# Patient Record
Sex: FEMALE | Race: WHITE | Employment: OTHER | URBAN - METROPOLITAN AREA
[De-identification: names, ages, dates, MRNs, and addresses within clinical notes are randomized per-mention and may not be internally consistent; named-entity substitution may affect disease eponyms.]

---

## 2017-10-04 PROBLEM — N82.4 COLOVAGINAL FISTULA: Status: ACTIVE | Noted: 2017-10-04

## 2019-01-01 ENCOUNTER — APPOINTMENT (OUTPATIENT)
Dept: CT IMAGING | Age: 84
End: 2019-01-01
Payer: MEDICARE

## 2019-01-01 ENCOUNTER — NURSE ONLY (OUTPATIENT)
Dept: FAMILY MEDICINE CLINIC | Age: 84
End: 2019-01-01
Payer: MEDICARE

## 2019-01-01 ENCOUNTER — HOSPITAL ENCOUNTER (OUTPATIENT)
Dept: NON INVASIVE DIAGNOSTICS | Age: 84
Discharge: HOME | End: 2019-01-28
Payer: MEDICARE

## 2019-01-01 ENCOUNTER — PROCEDURE VISIT (OUTPATIENT)
Dept: PODIATRY | Age: 84
End: 2019-01-01
Payer: MEDICARE

## 2019-01-01 ENCOUNTER — TELEPHONE (OUTPATIENT)
Dept: ADMINISTRATIVE | Age: 84
End: 2019-01-01

## 2019-01-01 ENCOUNTER — TELEPHONE (OUTPATIENT)
Dept: FAMILY MEDICINE CLINIC | Age: 84
End: 2019-01-01

## 2019-01-01 ENCOUNTER — HOSPITAL ENCOUNTER (EMERGENCY)
Age: 84
Discharge: ANOTHER HOSPITAL | End: 2019-05-24
Attending: EMERGENCY MEDICINE
Payer: MEDICARE

## 2019-01-01 ENCOUNTER — OFFICE VISIT (OUTPATIENT)
Dept: FAMILY MEDICINE CLINIC | Age: 84
End: 2019-01-01
Payer: MEDICARE

## 2019-01-01 VITALS
HEIGHT: 67 IN | WEIGHT: 160.6 LBS | DIASTOLIC BLOOD PRESSURE: 64 MMHG | SYSTOLIC BLOOD PRESSURE: 104 MMHG | BODY MASS INDEX: 25.21 KG/M2

## 2019-01-01 VITALS
TEMPERATURE: 98.2 F | SYSTOLIC BLOOD PRESSURE: 91 MMHG | BODY MASS INDEX: 34.52 KG/M2 | DIASTOLIC BLOOD PRESSURE: 46 MMHG | HEIGHT: 57 IN | OXYGEN SATURATION: 72 % | RESPIRATION RATE: 16 BRPM | HEART RATE: 62 BPM | WEIGHT: 160 LBS

## 2019-01-01 VITALS
TEMPERATURE: 98.7 F | RESPIRATION RATE: 16 BRPM | SYSTOLIC BLOOD PRESSURE: 96 MMHG | HEART RATE: 64 BPM | BODY MASS INDEX: 30.25 KG/M2 | WEIGHT: 160.2 LBS | DIASTOLIC BLOOD PRESSURE: 52 MMHG | OXYGEN SATURATION: 100 % | HEIGHT: 61 IN

## 2019-01-01 DIAGNOSIS — M79.675 PAIN OF TOE OF LEFT FOOT: ICD-10-CM

## 2019-01-01 DIAGNOSIS — R26.2 DIFFICULTY WALKING: ICD-10-CM

## 2019-01-01 DIAGNOSIS — B35.1 DERMATOPHYTOSIS OF NAIL: Primary | ICD-10-CM

## 2019-01-01 DIAGNOSIS — K92.1 GASTROINTESTINAL HEMORRHAGE WITH MELENA: Primary | ICD-10-CM

## 2019-01-01 DIAGNOSIS — K62.5 RECTAL BLEEDING: ICD-10-CM

## 2019-01-01 DIAGNOSIS — I48.0 PAROXYSMAL ATRIAL FIBRILLATION (HCC): Primary | ICD-10-CM

## 2019-01-01 DIAGNOSIS — C18.9 MALIGNANT NEOPLASM OF COLON, UNSPECIFIED PART OF COLON (HCC): ICD-10-CM

## 2019-01-01 DIAGNOSIS — M79.674 PAIN OF TOE OF RIGHT FOOT: ICD-10-CM

## 2019-01-01 DIAGNOSIS — I73.9 PERIPHERAL VASCULAR DISORDER (HCC): ICD-10-CM

## 2019-01-01 DIAGNOSIS — I10 ESSENTIAL HYPERTENSION, BENIGN: ICD-10-CM

## 2019-01-01 DIAGNOSIS — Z95.2 S/P TAVR (TRANSCATHETER AORTIC VALVE REPLACEMENT): ICD-10-CM

## 2019-01-01 LAB
ABO/RH: NORMAL
ALBUMIN SERPL-MCNC: 3.6 G/DL (ref 3.5–5.2)
ALP BLD-CCNC: 54 U/L (ref 35–104)
ALT SERPL-CCNC: 11 U/L (ref 0–32)
ANION GAP SERPL CALCULATED.3IONS-SCNC: 14 MMOL/L (ref 7–16)
ANTIBODY SCREEN: NORMAL
AST SERPL-CCNC: 19 U/L (ref 0–31)
BASOPHILS ABSOLUTE: 0.02 E9/L (ref 0–0.2)
BASOPHILS RELATIVE PERCENT: 0.3 % (ref 0–2)
BILIRUB SERPL-MCNC: 0.2 MG/DL (ref 0–1.2)
BLOOD BANK DISPENSE STATUS: NORMAL
BLOOD BANK DISPENSE STATUS: NORMAL
BLOOD BANK PRODUCT CODE: NORMAL
BLOOD BANK PRODUCT CODE: NORMAL
BPU ID: NORMAL
BPU ID: NORMAL
BUN BLDV-MCNC: 72 MG/DL (ref 8–23)
CALCIUM SERPL-MCNC: 8.6 MG/DL (ref 8.6–10.2)
CHLORIDE BLD-SCNC: 101 MMOL/L (ref 98–107)
CHP ED QC CHECK: NORMAL
CO2: 24 MMOL/L (ref 22–29)
CREAT SERPL-MCNC: 1.4 MG/DL (ref 0.5–1)
DESCRIPTION BLOOD BANK: NORMAL
DESCRIPTION BLOOD BANK: NORMAL
EKG ATRIAL RATE: 59 BPM
EKG Q-T INTERVAL: 456 MS
EKG QRS DURATION: 134 MS
EKG QTC CALCULATION (BAZETT): 478 MS
EKG R AXIS: 95 DEGREES
EKG T AXIS: 18 DEGREES
EKG VENTRICULAR RATE: 66 BPM
EOSINOPHILS ABSOLUTE: 0.1 E9/L (ref 0.05–0.5)
EOSINOPHILS RELATIVE PERCENT: 1.6 % (ref 0–6)
GFR AFRICAN AMERICAN: 43
GFR NON-AFRICAN AMERICAN: 36 ML/MIN/1.73
GLUCOSE BLD-MCNC: 119 MG/DL (ref 74–99)
HCT VFR BLD CALC: 21.7 % (ref 34–48)
HEMOCCULT STL QL: POSITIVE
HEMOGLOBIN: 6.4 G/DL (ref 11.5–15.5)
HGB, POC: 8.2
HGB, POC: 8.6
IMMATURE GRANULOCYTES #: 0.04 E9/L
IMMATURE GRANULOCYTES %: 0.6 % (ref 0–5)
INR BLD: 5.3
INTERNATIONAL NORMALIZATION RATIO, POC: 1.4
INTERNATIONAL NORMALIZATION RATIO, POC: 2.5
INTERNATIONAL NORMALIZATION RATIO, POC: 2.9
INTERNATIONAL NORMALIZATION RATIO, POC: 3.2
LACTIC ACID, SEPSIS: 2.2 MMOL/L (ref 0.5–1.9)
LV EF: 65 %
LVEF MODALITY: NORMAL
LYMPHOCYTES ABSOLUTE: 1.12 E9/L (ref 1.5–4)
LYMPHOCYTES RELATIVE PERCENT: 17.6 % (ref 20–42)
MCH RBC QN AUTO: 32 PG (ref 26–35)
MCHC RBC AUTO-ENTMCNC: 29.5 % (ref 32–34.5)
MCV RBC AUTO: 108.5 FL (ref 80–99.9)
MONOCYTES ABSOLUTE: 0.5 E9/L (ref 0.1–0.95)
MONOCYTES RELATIVE PERCENT: 7.8 % (ref 2–12)
NEUTROPHILS ABSOLUTE: 4.59 E9/L (ref 1.8–7.3)
NEUTROPHILS RELATIVE PERCENT: 72.1 % (ref 43–80)
PDW BLD-RTO: 15.9 FL (ref 11.5–15)
PLATELET # BLD: 132 E9/L (ref 130–450)
PMV BLD AUTO: 10.4 FL (ref 7–12)
POTASSIUM SERPL-SCNC: 4.4 MMOL/L (ref 3.5–5)
PROTHROMBIN TIME, POC: 17.4
PROTHROMBIN TIME, POC: 30.4
PROTHROMBIN TIME, POC: 34.5
PROTHROMBIN TIME, POC: 38
PROTHROMBIN TIME: 58.4 SEC (ref 9.3–12.4)
RBC # BLD: 2 E12/L (ref 3.5–5.5)
SODIUM BLD-SCNC: 139 MMOL/L (ref 132–146)
TOTAL PROTEIN: 5.3 G/DL (ref 6.4–8.3)
TROPONIN: <0.01 NG/ML (ref 0–0.03)
WBC # BLD: 6.4 E9/L (ref 4.5–11.5)

## 2019-01-01 PROCEDURE — 85610 PROTHROMBIN TIME: CPT | Performed by: FAMILY MEDICINE

## 2019-01-01 PROCEDURE — 84484 ASSAY OF TROPONIN QUANT: CPT

## 2019-01-01 PROCEDURE — 99285 EMERGENCY DEPT VISIT HI MDM: CPT

## 2019-01-01 PROCEDURE — 85018 HEMOGLOBIN: CPT | Performed by: FAMILY MEDICINE

## 2019-01-01 PROCEDURE — 11721 DEBRIDE NAIL 6 OR MORE: CPT | Performed by: PODIATRIST

## 2019-01-01 PROCEDURE — 93306 TTE W/DOPPLER COMPLETE: CPT

## 2019-01-01 PROCEDURE — 86923 COMPATIBILITY TEST ELECTRIC: CPT

## 2019-01-01 PROCEDURE — 36430 TRANSFUSION BLD/BLD COMPNT: CPT

## 2019-01-01 PROCEDURE — 83605 ASSAY OF LACTIC ACID: CPT

## 2019-01-01 PROCEDURE — 74176 CT ABD & PELVIS W/O CONTRAST: CPT

## 2019-01-01 PROCEDURE — 86850 RBC ANTIBODY SCREEN: CPT

## 2019-01-01 PROCEDURE — 1123F ACP DISCUSS/DSCN MKR DOCD: CPT | Performed by: FAMILY MEDICINE

## 2019-01-01 PROCEDURE — 80053 COMPREHEN METABOLIC PANEL: CPT

## 2019-01-01 PROCEDURE — 93005 ELECTROCARDIOGRAM TRACING: CPT | Performed by: STUDENT IN AN ORGANIZED HEALTH CARE EDUCATION/TRAINING PROGRAM

## 2019-01-01 PROCEDURE — 93010 ELECTROCARDIOGRAM REPORT: CPT | Performed by: INTERNAL MEDICINE

## 2019-01-01 PROCEDURE — 86901 BLOOD TYPING SEROLOGIC RH(D): CPT

## 2019-01-01 PROCEDURE — 1090F PRES/ABSN URINE INCON ASSESS: CPT | Performed by: FAMILY MEDICINE

## 2019-01-01 PROCEDURE — 4040F PNEUMOC VAC/ADMIN/RCVD: CPT | Performed by: FAMILY MEDICINE

## 2019-01-01 PROCEDURE — G8427 DOCREV CUR MEDS BY ELIG CLIN: HCPCS | Performed by: FAMILY MEDICINE

## 2019-01-01 PROCEDURE — G8417 CALC BMI ABV UP PARAM F/U: HCPCS | Performed by: FAMILY MEDICINE

## 2019-01-01 PROCEDURE — 93793 ANTICOAG MGMT PT WARFARIN: CPT | Performed by: FAMILY MEDICINE

## 2019-01-01 PROCEDURE — 2580000003 HC RX 258: Performed by: STUDENT IN AN ORGANIZED HEALTH CARE EDUCATION/TRAINING PROGRAM

## 2019-01-01 PROCEDURE — 85025 COMPLETE CBC W/AUTO DIFF WBC: CPT

## 2019-01-01 PROCEDURE — 99214 OFFICE O/P EST MOD 30 MIN: CPT | Performed by: FAMILY MEDICINE

## 2019-01-01 PROCEDURE — 1036F TOBACCO NON-USER: CPT | Performed by: FAMILY MEDICINE

## 2019-01-01 PROCEDURE — P9016 RBC LEUKOCYTES REDUCED: HCPCS

## 2019-01-01 PROCEDURE — 85610 PROTHROMBIN TIME: CPT

## 2019-01-01 PROCEDURE — 6370000000 HC RX 637 (ALT 250 FOR IP): Performed by: STUDENT IN AN ORGANIZED HEALTH CARE EDUCATION/TRAINING PROGRAM

## 2019-01-01 PROCEDURE — 86900 BLOOD TYPING SEROLOGIC ABO: CPT

## 2019-01-01 RX ORDER — LISINOPRIL 5 MG/1
TABLET ORAL
Refills: 3 | COMMUNITY
Start: 2019-01-01 | End: 2019-01-01 | Stop reason: SDUPTHER

## 2019-01-01 RX ORDER — LISINOPRIL 5 MG/1
TABLET ORAL
Qty: 90 TABLET | Refills: 3 | Status: SHIPPED | OUTPATIENT
Start: 2019-01-01 | End: 2019-01-01 | Stop reason: SDUPTHER

## 2019-01-01 RX ORDER — 0.9 % SODIUM CHLORIDE 0.9 %
250 INTRAVENOUS SOLUTION INTRAVENOUS ONCE
Status: COMPLETED | OUTPATIENT
Start: 2019-01-01 | End: 2019-01-01

## 2019-01-01 RX ORDER — METOLAZONE 2.5 MG/1
2.5 TABLET ORAL DAILY
COMMUNITY
End: 2019-01-01 | Stop reason: SDUPTHER

## 2019-01-01 RX ORDER — ASPIRIN 81 MG/1
81 TABLET ORAL DAILY
Qty: 90 TABLET | Refills: 1 | Status: ON HOLD
Start: 2019-01-01 | End: 2019-01-01 | Stop reason: HOSPADM

## 2019-01-01 RX ORDER — WARFARIN SODIUM 3 MG/1
TABLET ORAL
Status: ON HOLD | COMMUNITY
Start: 2019-01-01 | End: 2019-01-01 | Stop reason: HOSPADM

## 2019-01-01 RX ORDER — 0.9 % SODIUM CHLORIDE 0.9 %
500 INTRAVENOUS SOLUTION INTRAVENOUS ONCE
Status: COMPLETED | OUTPATIENT
Start: 2019-01-01 | End: 2019-01-01

## 2019-01-01 RX ORDER — LISINOPRIL 5 MG/1
TABLET ORAL
Qty: 90 TABLET | Refills: 3 | Status: ON HOLD | OUTPATIENT
Start: 2019-01-01 | End: 2019-01-01 | Stop reason: HOSPADM

## 2019-01-01 RX ORDER — CLOPIDOGREL BISULFATE 75 MG/1
TABLET ORAL
Refills: 0 | COMMUNITY
Start: 2019-01-01 | End: 2019-01-01 | Stop reason: ALTCHOICE

## 2019-01-01 RX ORDER — PHYTONADIONE 5 MG/1
5 TABLET ORAL ONCE
Status: DISCONTINUED | OUTPATIENT
Start: 2019-01-01 | End: 2019-01-01

## 2019-01-01 RX ORDER — METOLAZONE 2.5 MG/1
2.5 TABLET ORAL DAILY
Qty: 36 TABLET | Refills: 3 | Status: ON HOLD | OUTPATIENT
Start: 2019-01-01 | End: 2019-01-01 | Stop reason: HOSPADM

## 2019-01-01 RX ADMIN — SODIUM CHLORIDE 250 ML: 9 INJECTION, SOLUTION INTRAVENOUS at 14:00

## 2019-01-01 RX ADMIN — SODIUM CHLORIDE 500 ML: 9 INJECTION, SOLUTION INTRAVENOUS at 12:41

## 2019-01-01 ASSESSMENT — PATIENT HEALTH QUESTIONNAIRE - PHQ9
SUM OF ALL RESPONSES TO PHQ9 QUESTIONS 1 & 2: 0
1. LITTLE INTEREST OR PLEASURE IN DOING THINGS: 0
SUM OF ALL RESPONSES TO PHQ QUESTIONS 1-9: 0
2. FEELING DOWN, DEPRESSED OR HOPELESS: 0
SUM OF ALL RESPONSES TO PHQ QUESTIONS 1-9: 0

## 2019-01-01 ASSESSMENT — ENCOUNTER SYMPTOMS
DIARRHEA: 0
BACK PAIN: 0
CONSTIPATION: 0
WHEEZING: 0
SHORTNESS OF BREATH: 0
VOMITING: 0
ABDOMINAL DISTENTION: 0
NAUSEA: 0
COLOR CHANGE: 1
RHINORRHEA: 0
TROUBLE SWALLOWING: 0
ABDOMINAL PAIN: 0
COUGH: 0
SINUS PRESSURE: 0
CHEST TIGHTNESS: 0
SORE THROAT: 0
BLOOD IN STOOL: 1

## 2019-05-07 NOTE — PROGRESS NOTES
HPI:  The patient is a 80 y.o. female who presents today to establish care. The patient had trans catheter aortic valve replacement done on 4/22 by Dr Noelle Peñaloza at [facility] and needs anticoagulation management for valve. She has h/o breast cancer in 2009. She was recently was diagnosed with colon cancer but could not do the surgery due aortic valve. She is breathing better since surgery. She has cardiologist at the [facility] also. She has h/o A fib and has been on Coumadin for years. She is on 4 mg daily. She is also on Plavix and ASA in addition since the surgery.      Past Medical History:   Diagnosis Date    A-fib [facility])     Acute bronchospasm 8/29/2014    Acute respiratory failure with hypoxia ([address] 8/29/2014    Anticoagulant long-term use     Anticoagulated on Coumadin 8/29/2014    Arthritis     Atrial fibrillation and flutter ([address] 8/29/2014    CAD (coronary artery disease)     Cancer [facility])     right breast, oncology Dr Yony Rodriguez of ascending colon [facility])     Cellulitis of arm, right 12/26/2015    CHF (congestive heart failure) (HCC)     Chronic back pain     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    Diverticular disease     Hearing loss     Heart murmur     Hyperlipidemia     Hypothyroidism 8/29/2014    Kidney disease     Osteoarthritis     Peripheral edema 8/29/2014    Pneumonia due to Streptococcus pneumoniae ([address] 8/29/2014    Probable      Thyroid disease       Past Surgical History:   Procedure Laterality Date    APPENDECTOMY      BREAST SURGERY  2009    lumpectomy with LND, chemo and radiation    CARDIAC VALVE REPLACEMENT      trans catheter aortic valve replacement    CHOLECYSTECTOMY      lap    COLECTOMY  10/04/2017    LAR and SBR    [facility]  2016, [facility]    EYE SURGERY Left     for macular hole    HAND SURGERY      HYSTERECTOMY      ALY/USO    JOINT REPLACEMENT      bilateral knees    TONSILLECTOMY AND ADENOIDECTOMY sensitive skin  ENT:  No cough, no sore throat, no sinus pain, no runny nose, no ear pain  Cardiology:  No chest pain, no palpitations, no leg edema, no shortness of breath, no PND  Endocrinology:  No polydipsia, no polyuria, no cold intolerance, no heat intolerance, no polyphagia, no hair changes  Gastroenterology:  No dysphagia, no abdominal pain, no nausea, no vomiting, no constipation, no diarrhea, no heartburn  Female Reproductive:  No hot flashes, no abnormal vaginal discharge, no pain with menstruation, no pelvic pain  Musculoskeletal:  No joint pain, no leg cramps, no back pain, no muscle aches  Respiratory:  No shortness of breath, no orthopnea, no wheezing, no ZAPATA, no hemoptysis  Urology:  No blood in the urine, no urinary frequency, no urinary incontinence, no urinary urgency, no nocturia, no dysuria  Neurology:  No numbness/tingling, no dizziness, no weakness  Psychology:  No depression, no sleep disturbances, no suicidal ideation, no anxiety    Physical Exam:  Vitals:    05/07/19 0917   BP: (!) 96/52   Pulse: 64   Resp: 16   Temp: 98.7 °F (37.1 °C)   TempSrc: Oral   SpO2: 100%   Weight: 160 lb 3.2 oz (72.7 kg)   Height: 5' 1\" (1.549 m)     General:  Patient alert and oriented x 3, NAD, pleasant  HEENT:  Atraumatic, normocephalic, PERRLA, EOMI, clear conjunctiva, TMs clear, nose-clear, throat - no erythema, tonsils- wnl  Neck:  Supple, no goiter, no carotid bruits, no lymphadenopathy  Lungs:  CTA B  Heart:  RRR, no murmurs, gallops or rubs  Abdomen:  Soft, NTND, + bowel sounds  Back: full ROM, no CVA tenderness  Extremities:  No clubbing, cyanosis or edema  Neuro:  CN II-XII grossly intact, 5/5 strength in bilateral upper and lower extremities, 2 + reflexes. Skin: unremarkable    Assessment/Plan:  Armin Mcintosh was seen today for establish care.     Diagnoses and all orders for this visit:    Paroxysmal atrial fibrillation ([address]  -     POCT INR    Essential hypertension, benign    Malignant neoplasm of

## 2019-05-13 PROBLEM — B35.1 DERMATOPHYTOSIS OF NAIL: Status: ACTIVE | Noted: 2019-01-01

## 2019-05-13 PROBLEM — I73.9 PERIPHERAL VASCULAR DISORDER (HCC): Status: ACTIVE | Noted: 2019-01-01

## 2019-05-13 NOTE — PROGRESS NOTES
19     Escobar Learn    : 4/15/1932  Sex: female  Age: 80 y.o. Subjective: The patient is seen today for evaluation regarding foot evaluation and mycotic nail care. No other complaints noted. No chief complaint on file. Current Medications:    Current Outpatient Medications:     clopidogrel (PLAVIX) 75 MG tablet, TAKE ONE TABLET BY MOUTH EVERY DAY. DISCONTINUE WHEN INR IS > 2., Disp: , Rfl: 0    lisinopril (PRINIVIL;ZESTRIL) 5 MG tablet, TAKE ONE TABLET BY MOUTH EVERY DAY, Disp: , Rfl: 3    metolazone (ZAROXOLYN) 2.5 MG tablet, Take 2.5 mg by mouth daily Monday, Wednesday and Friday, Disp: , Rfl:     aspirin EC 81 MG EC tablet, Take 1 tablet by mouth daily, Disp: 90 tablet, Rfl: 1    furosemide (LASIX) 40 MG tablet, Take 1 tablet by mouth 2 times daily, Disp: 60 tablet, Rfl: 0    warfarin (COUMADIN) 4 MG tablet, Take 4 mg by mouth daily Indications: every Tuesday, Thursday, and Saturday, Disp: , Rfl:     calcium carbonate (OSCAL) 500 MG TABS tablet, Take 500 mg by mouth daily, Disp: , Rfl:     Cholecalciferol (VITAMIN D-3 PO), Take 1 capsule by mouth daily, Disp: , Rfl:     acetaminophen-codeine (TYLENOL #3) 300-30 MG per tablet, Take 1 tablet by mouth daily, Disp: , Rfl:     levothyroxine (SYNTHROID) 25 MCG tablet, Take 25 mcg by mouth Daily. , Disp: , Rfl:     Multiple Vitamins-Minerals (CENTRUM SILVER ADULT 50+ PO), Take 1 tablet by mouth daily , Disp: , Rfl:     omeprazole (PRILOSEC) 20 MG capsule, Take 40 mg by mouth daily , Disp: , Rfl:     warfarin (COUMADIN) 3 MG tablet, , Disp: , Rfl:     Allergies:   Allergies   Allergen Reactions    Penicillins Hives       Past Surgical History:   Procedure Laterality Date    APPENDECTOMY      BREAST SURGERY  2009    lumpectomy with LND, chemo and radiation    CARDIAC VALVE REPLACEMENT      trans catheter aortic valve replacement    CHOLECYSTECTOMY      lap    COLECTOMY  10/04/2017    LAR and SBR    [facility]COPY  , [facility]    EYE SURGERY Left     for macular hole    HAND SURGERY      HYSTERECTOMY      ALY/USO    JOINT REPLACEMENT      bilateral knees    TONSILLECTOMY AND ADENOIDECTOMY       Past Medical History:   Diagnosis Date    A-fib ([address]     Acute bronchospasm 8/29/2014    Acute respiratory failure with hypoxia ([address] 8/29/2014    Anticoagulant long-term use     Anticoagulated on Coumadin 8/29/2014    Arthritis     Atrial fibrillation and flutter ([address] 8/29/2014    CAD (coronary artery disease)     Cancer (HCC)     right breast, oncology Dr Anibal Tan of ascending colon ([address]     Cellulitis of arm, right 12/26/2015    CHF (congestive heart failure) (HCC)     Chronic back pain     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 At this time the nail/s 1, 2, 5 right foot and nail/s 1, 2, 5 left foot are noted to be thickened, dystrophic and discolored with subungual debris present. Tenderness noted to palpation. Minimal hair growth is noted to both lower extremities. Edematous issues noted to both lower extremities with varicosities present bilaterally. Coolness is noted to the digital regions to palpation. Capillary fill time delayed digital areas bilateral foot. No heel fissuring or macerations of the web spaces. No plantar calluses and/or ulcerative areas are noted. Patient is having difficulty with gait/walking.              Plan Per Assessment  Diagnoses and all orders for this visit:    Dermatophytosis of nail    Pain of toe of right foot    Pain of toe of left foot    Peripheral vascular disorder (HCC)    Difficulty walking        1. Evaluation and Management  2. Manual and electrical debridement of the mycotic nails was performed for thickness and length to prevent injection and/or ulceration. 3. I recommended antifungal cream to the nails daily. 4. It was discussed in detail with the patient proper caring for the vascular compromised foot. The fact that they have compromised blood flow put the patient at risk for infection/gangrene/amputation. The patient should not walk barefoot. Shoe gear should fit properly and socks should be worn with shoes. Exercise is very important to prevent worsening of the disease process but before performing an exercise program should check with their family physician first.  If any skin lesions are noted, they are instructed to contact the office immediately. 5. We will see the patient back at a later date for continued podiatric management and care. Patient was advised to call the office with any questions or concerns prior to their next appointment if needed. Seen By:    Lonnie Pierre DPM    Electronically signed by Lonnie Pierre DPM on 5/13/2019 at 11:07 AM      This note was created using voice recognition software. The note was reviewed however may contain grammatical errors.

## 2019-05-16 NOTE — TELEPHONE ENCOUNTER
Spoke with patient daughter, she said that the patient has colon cancer and is having bright red rectal bleeding when she goes to the bathroom. She said that she is having 2-3 boughts of diarrhea with blood. She thinks the patient took plavix yesterday. I spoke with Dr Gunnar Villalpando, she advised the patient to go to ED. Patient daughter notified. She said that she is going to try to get ahold of her Dr in [facility] [employer] again to see what he suggests. I told her that she really needed to take her to ER.  She OK but she was concerned because of her new heart valve what they would do to her in the hospital.

## 2019-05-16 NOTE — TELEPHONE ENCOUNTER
Patient should stop plavix. Continue BP med. Sent refill to pharmacy. Take iron pills as prescribed by oncologist.  Repeat inr Monday.   If bleeding worsens-To ER

## 2019-05-17 NOTE — TELEPHONE ENCOUNTER
Spoke to patient's daughter and she will notify her mother that she needs to come for INR today. Instructed daughter to make sure she is not taking Plavix. Patient's daughter states she called  in [facility] [place] last night and was told to not give her anymore than 4 mg of Coumadin last night and that is the dose she gave her. Daughter asked that any instructions given to patient be written down because she does have a tendency to forget. Daughter also said that they did not go to ER and bleeding did slow down.

## 2019-05-24 PROBLEM — K92.2 GI BLEED: Status: ACTIVE | Noted: 2019-01-01

## 2019-05-24 NOTE — ED NOTES
Received critical hgb 6.4 from lab. Dr. Shannan Soriano notified.      Wyatt Herrera, RN  05/24/19 [id number]

## 2019-05-24 NOTE — ED NOTES
Report given to Hillary Vuong at [facility].      Shubham Knowles RN  05/24/19 4896

## 2019-05-24 NOTE — ED NOTES
Accepted at [facility] to [facility] bed 703. Nurse to nurse can be called to [phone].       Thora Cockayne, RN  05/24/19 7352

## 2019-05-24 NOTE — CARE COORDINATION
OF THE [facility] notified of ED PCP request for [facility].     Electronically signed by Afshin Lopes on 0/75/1609 at 4:54 PM

## 2019-05-24 NOTE — ED NOTES
[facility] [employer] present to  patient.   Taylor ZEPEDA getting unit of blood for medivac to hang     Dasia Hale RN  05/24/19 3838

## 2019-05-24 NOTE — PROGRESS NOTES
[phone] called [phone] [facility] to Transfer the patient to [facility]. Need to speak with Cardiology- Dr. Hailee Gray and Colorectal surgeon Dr. Chandu Jc. Dx: Anemia, GI bleed on coumadin for TARV, patient needs transfusion, Colon CA.     Reason for transfer: Doctors are at [facility]

## 2019-05-24 NOTE — ED NOTES
Blood infusion began. Remaining at bedside for first 15 minutes of infusion.      Gianluca Rodgers RN  05/24/19 7071

## 2019-05-24 NOTE — ED PROVIDER NOTES
Murmur heard. Pulmonary/Chest: Effort normal and breath sounds normal. No respiratory distress. She has no wheezes. She has no rales. She exhibits no tenderness. Abdominal: Soft. She exhibits no distension and no mass. There is no tenderness. There is no rebound and no guarding. Musculoskeletal: Normal range of motion. She exhibits no tenderness. Neurological: She is alert and oriented to person, place, and time. Skin: Skin is warm and dry. No rash noted. She is not diaphoretic. There is pallor. Psychiatric: She has a normal mood and affect. Her behavior is normal. Judgment and thought content normal.       Procedures    MDM  Number of Diagnoses or Management Options  Gastrointestinal hemorrhage with melena: Malignant neoplasm of colon, unspecified part of colon [facility]):   Diagnosis management comments: Patient is a 51-year-old female who presented to ED for evaluation of rectal bleeding in setting of colorectal cancer and anticoagulated on Coumadin with recent TAVR. On arrival to ED, physical exam significant for patient with pale appearance, hemodynamically stable, in no apparent distress with abdomen soft and nontender to palpation. Labs revealed patient anemic with supratherapeutic INR. Arnaud Nelson Did not reverse INR in setting of GI bleed secondary to recent TAVR. Cardiology was consulted and agrees with decision for no reversal with no recommendation at this time. Patient was crossed for blood products. Imaging with no significant findings. 11:57 PM-- informed of laboratory result findings with hgb 6.4. Order for crossed blood products placed. 12:02 PM-- reevaluation at this time with vitals unremarkable and within normal limits. Patient with no new complaints. 12:31 PM-- informed of supratherapeutic INR 5.3. Will administer vitamin K in setting of active bleed with supra-therapeutic INR.   12:35 PM-- patient cannot undergo imaging with IV contrast secondary to acute kidney injury, will obtain CT imaging of the abdomen without contrast.  1:28 PM-- No significant findings on imaging. Patient was reevaluated at this time. There are no new complaints. 2:06 PM-- patient not wanting to be admitted. Spoke with patient at length with family member present. Patient agrees to admission for further evaluation. 2:38 PM--  2:32 PM-- Spoke with cardiologist SHANTAL, Nirav Ivan-- agrees with no reversal of Coumadin at this time in setting of recent TAVR. No recommendations at this time. 4:33 PM-- Spoke with [employer], no ALS available. Will try to fly patient.       --------------------------------------------- PAST HISTORY ---------------------------------------------  Past Medical History:  has a past medical history of A-fib ([address] Acute bronchospasm, Acute respiratory failure with hypoxia ([address] Anticoagulant long-term use, Anticoagulated on Coumadin, Arthritis, Atrial fibrillation and flutter ([address] CAD (coronary artery disease), Cancer ([address] Cancer of ascending colon ([address] Cellulitis of arm, right, CHF (congestive heart failure) ([address] Chronic back pain, Colovaginal fistula, Diverticular disease, Hearing loss, Heart murmur, Hyperlipidemia, Hypothyroidism, Kidney disease, Osteoarthritis, Peripheral edema, Pneumonia due to Streptococcus pneumoniae ([address] and Thyroid disease. Past Surgical History:  has a past surgical history that includes Cholecystectomy; Hand surgery; Breast surgery (2009); eye surgery (Left); joint replacement; Appendectomy; Hysterectomy; Tonsillectomy and adenoidectomy; colectomy (10/04/2017); Colonoscopy (2016, 2019); and Cardiac valve replacement. Social History:  reports that she quit smoking about 39 years ago. Her smoking use included cigarettes. She has a 30.00 pack-year smoking history. She has never used smokeless tobacco. She reports that she does not drink alcohol or use drugs.     Family History: family history includes Cancer in her brother and mother; No Known Problems in Value Ref Range    Troponin <0.01 0.00 - 0.03 ng/mL   POCT occult blood stool   Result Value Ref Range    OCCULT BLOOD FECAL positive     QC OK? ok    EKG 12 Lead   Result Value Ref Range    Ventricular Rate 66 BPM    Atrial Rate 59 BPM    QRS Duration 134 ms    Q-T Interval 456 ms    QTc Calculation (Bazett) 478 ms    R Axis 95 degrees    T Axis 18 degrees   TYPE AND SCREEN   Result Value Ref Range    ABO/Rh A NEG     Antibody Screen NEG    PREPARE RBC (CROSSMATCH), 2 Units   Result Value Ref Range    Product Code Blood Bank [id number]     Description Blood Bank Red Blood Cells, Leuko-reduced     Unit Number [id number]     Dispense Status Blood Bank issued     Product Code Blood Bank [id number]     Description Blood Bank Red Blood Cells, Leuko-reduced     Unit Number [id number]     Dispense Status Blood Bank issued        Radiology  CT ABDOMEN PELVIS WO CONTRAST Additional Contrast? None   Final Result   Postsurgical changes in the small bowel loops with dilated bowel loops   likely ileus. Distended urinary bladder with wall thickening. Clinical assessment is   recommended to evaluate cystitis.                ------------------------- NURSING NOTES AND VITALS REVIEWED ---------------------------  Date / Time Roomed:  5/24/2019 11:05 AM  ED Bed Assignment:  JANES/JANES    The nursing notes within the ED encounter and vital signs as below have been reviewed.    Patient Vitals for the past 24 hrs:   BP Temp Temp src Pulse Resp SpO2 Height Weight   05/24/19 1615 (!) 91/46 -- -- 62 -- (!) 72 % -- --   05/24/19 1518 (!) 111/47 98.2 °F (36.8 °C) Oral 69 16 97 % -- --   05/24/19 1515 (!) 111/47 -- -- 66 -- (!) 84 % -- --   05/24/19 1430 (!) 109/51 -- -- 62 -- 97 % -- --   05/24/19 1415 (!) 109/51 97.9 °F (36.6 °C) Oral 60 18 95 % -- --   05/24/19 1400 (!) 95/44 -- -- 75 -- 96 % -- --   05/24/19 1345 (!) 92/50 98 °F (36.7 °C) Oral 72 20 95 % -- --   05/24/19 1330 (!) 86/38 -- -- 58 -- 94 % -- --   05/24/19 1315 (!) 97/52 -- -- 64 -- 92 % -- --   05/24/19 1240 (!) 92/38 -- -- 61 20 97 % -- --   05/24/19 1103 127/60 97.9 °F (36.6 °C) Oral 79 18 98 % 4' 9\" (1.448 m) 160 lb (72.6 kg)       Oxygen Saturation Interpretation: Normal      ------------------------------------------ PROGRESS NOTES ------------------------------------------  Re-evaluation(s):  Time: 11:57 AM.  Patients symptoms show no change  Repeat physical examination is not changed    Time: 1:00 PM.  Patients symptoms show no change  Repeat physical examination is not changed    I have spoken with the patient and daughter and discussed todays results, in addition to providing specific details for the plan of care and counseling regarding the diagnosis and prognosis. Their questions are answered at this time and they are agreeable with the plan.      --------------------------------- ADDITIONAL PROVIDER NOTES ---------------------------------  Consultations:  Patient was auto-accepted    This patient's ED course included: a personal history and physicial examination, re-evaluation prior to disposition, multiple bedside re-evaluations, IV medications, cardiac monitoring, continuous pulse oximetry and complex medical decision making and emergency management    This patient has remained hemodynamically stable during their ED course. Please note that the withdrawal or failure to initiate urgent interventions for this patient would likely result in a life threatening deterioration or permanent disability. Accordingly this patient received 30 minutes of critical care time, excluding separately billable procedures. Clinical Impression  1. Gastrointestinal hemorrhage with melena    2. Malignant neoplasm of colon, unspecified part of colon [facility])          Disposition  Patient's disposition: Transfer to [facility]   Patient's condition is stable.              Macie Goldstein DO  Resident  05/25/19 0000

## 2019-06-02 PROBLEM — I35.1 AORTIC INSUFFICIENCY: Status: ACTIVE | Noted: 2019-01-01

## 2019-06-02 PROBLEM — A04.72 CLOSTRIDIUM DIFFICILE COLITIS: Status: ACTIVE | Noted: 2019-01-01

## 2022-12-01 NOTE — ED NOTES
[facility] spoke with Rakesh Encarnacion regarding the need to speak with Dr. Alba Nam. Rakesh Encarnacion states \"that's not how things go here, I get the clinical information and give to the doctor.  Dr. Alba Nam reviewed the chart and accepts the patient\"     Salmaayan Madeline  05/24/19 152 Attending and PA/NP shared services statement (NON-critical care):